# Patient Record
Sex: MALE | Race: WHITE
[De-identification: names, ages, dates, MRNs, and addresses within clinical notes are randomized per-mention and may not be internally consistent; named-entity substitution may affect disease eponyms.]

---

## 2017-07-08 ENCOUNTER — HOSPITAL ENCOUNTER (EMERGENCY)
Dept: HOSPITAL 47 - EC | Age: 52
Discharge: HOME | End: 2017-07-08
Payer: COMMERCIAL

## 2017-07-08 VITALS
SYSTOLIC BLOOD PRESSURE: 125 MMHG | DIASTOLIC BLOOD PRESSURE: 77 MMHG | TEMPERATURE: 98.2 F | RESPIRATION RATE: 16 BRPM | HEART RATE: 75 BPM

## 2017-07-08 DIAGNOSIS — Z79.82: ICD-10-CM

## 2017-07-08 DIAGNOSIS — Z79.899: ICD-10-CM

## 2017-07-08 DIAGNOSIS — I48.0: Primary | ICD-10-CM

## 2017-07-08 LAB
ALP SERPL-CCNC: 83 U/L (ref 38–126)
ALT SERPL-CCNC: 60 U/L (ref 21–72)
ANION GAP SERPL CALC-SCNC: 14 MMOL/L
APTT BLD: 25.5 SEC (ref 22–30)
AST SERPL-CCNC: 32 U/L (ref 17–59)
BASOPHILS # BLD AUTO: 0.1 K/UL (ref 0–0.2)
BASOPHILS NFR BLD AUTO: 1 %
BUN SERPL-SCNC: 13 MG/DL (ref 9–20)
CALCIUM SPEC-MCNC: 9.5 MG/DL (ref 8.4–10.2)
CH: 30.8
CHCM: 35.4
CHLORIDE SERPL-SCNC: 106 MMOL/L (ref 98–107)
CK SERPL-CCNC: 89 U/L (ref 55–170)
CO2 SERPL-SCNC: 21 MMOL/L (ref 22–30)
EOSINOPHIL # BLD AUTO: 0.1 K/UL (ref 0–0.7)
EOSINOPHIL NFR BLD AUTO: 2 %
ERYTHROCYTE [DISTWIDTH] IN BLOOD BY AUTOMATED COUNT: 5.16 M/UL (ref 4.3–5.9)
ERYTHROCYTE [DISTWIDTH] IN BLOOD: 13.3 % (ref 11.5–15.5)
GLUCOSE SERPL-MCNC: 169 MG/DL (ref 74–99)
HCT VFR BLD AUTO: 45.1 % (ref 39–53)
HDW: 2.67
HGB BLD-MCNC: 16 GM/DL (ref 13–17.5)
INR PPP: 1 (ref ?–1.1)
LUC NFR BLD AUTO: 2 %
LYMPHOCYTES # SPEC AUTO: 1.8 K/UL (ref 1–4.8)
LYMPHOCYTES NFR SPEC AUTO: 26 %
MAGNESIUM SPEC-SCNC: 2.2 MG/DL (ref 1.6–2.3)
MCH RBC QN AUTO: 31 PG (ref 25–35)
MCHC RBC AUTO-ENTMCNC: 35.4 G/DL (ref 31–37)
MCV RBC AUTO: 87.4 FL (ref 80–100)
MONOCYTES # BLD AUTO: 0.4 K/UL (ref 0–1)
MONOCYTES NFR BLD AUTO: 5 %
NEUTROPHILS # BLD AUTO: 4.5 K/UL (ref 1.3–7.7)
NEUTROPHILS NFR BLD AUTO: 64 %
NON-AFRICAN AMERICAN GFR(MDRD): >60
POTASSIUM SERPL-SCNC: 4.1 MMOL/L (ref 3.5–5.1)
PROT SERPL-MCNC: 6.7 G/DL (ref 6.3–8.2)
PT BLD: 10.2 SEC (ref 9–12)
SODIUM SERPL-SCNC: 141 MMOL/L (ref 137–145)
TROPONIN I SERPL-MCNC: <0.012 NG/ML (ref 0–0.03)
WBC # BLD AUTO: 0.15 10*3/UL
WBC # BLD AUTO: 7 K/UL (ref 3.8–10.6)
WBC (PEROX): 6.37

## 2017-07-08 PROCEDURE — 82550 ASSAY OF CK (CPK): CPT

## 2017-07-08 PROCEDURE — 96366 THER/PROPH/DIAG IV INF ADDON: CPT

## 2017-07-08 PROCEDURE — 93005 ELECTROCARDIOGRAM TRACING: CPT

## 2017-07-08 PROCEDURE — 85025 COMPLETE CBC W/AUTO DIFF WBC: CPT

## 2017-07-08 PROCEDURE — 71010: CPT

## 2017-07-08 PROCEDURE — 85730 THROMBOPLASTIN TIME PARTIAL: CPT

## 2017-07-08 PROCEDURE — 82553 CREATINE MB FRACTION: CPT

## 2017-07-08 PROCEDURE — 84443 ASSAY THYROID STIM HORMONE: CPT

## 2017-07-08 PROCEDURE — 36415 COLL VENOUS BLD VENIPUNCTURE: CPT

## 2017-07-08 PROCEDURE — 85379 FIBRIN DEGRADATION QUANT: CPT

## 2017-07-08 PROCEDURE — 84484 ASSAY OF TROPONIN QUANT: CPT

## 2017-07-08 PROCEDURE — 96365 THER/PROPH/DIAG IV INF INIT: CPT

## 2017-07-08 PROCEDURE — 83735 ASSAY OF MAGNESIUM: CPT

## 2017-07-08 PROCEDURE — 85610 PROTHROMBIN TIME: CPT

## 2017-07-08 PROCEDURE — 80053 COMPREHEN METABOLIC PANEL: CPT

## 2017-07-08 PROCEDURE — 99284 EMERGENCY DEPT VISIT MOD MDM: CPT

## 2017-07-08 NOTE — XR
EXAMINATION TYPE: XR chest 1V portable

 

DATE OF EXAM: 7/8/2017

 

Comparison: None

 

Clinical History: 51-year-old male dysrhythmia

 

Findings:

 The cardiomediastinal silhouette, aorta, and pulmonary vasculature are within normal limits. Strandy
 atelectasis in the lower lungs. Otherwise, lungs and pleural spaces are clear.   

 

 

Impression:

 No acute cardiopulmonary process.

## 2017-07-12 ENCOUNTER — HOSPITAL ENCOUNTER (OUTPATIENT)
Dept: HOSPITAL 47 - LABWHC1 | Age: 52
Discharge: HOME | End: 2017-07-12
Payer: COMMERCIAL

## 2017-07-12 DIAGNOSIS — I10: ICD-10-CM

## 2017-07-12 DIAGNOSIS — I48.0: Primary | ICD-10-CM

## 2017-07-12 PROCEDURE — 83704 LIPOPROTEIN BLD QUAN PART: CPT

## 2017-07-12 PROCEDURE — 36415 COLL VENOUS BLD VENIPUNCTURE: CPT

## 2017-07-15 LAB
LDLC SERPL ELPH-MCNC: 1655 NMOL/L (ref ?–1000)
Lab: 20.5 NM (ref 20.8–?)
Lab: 798 NMOL/L (ref ?–527)

## 2017-09-07 ENCOUNTER — HOSPITAL ENCOUNTER (OUTPATIENT)
Dept: HOSPITAL 47 - SLEEP | Age: 52
End: 2017-09-07
Attending: INTERNAL MEDICINE
Payer: COMMERCIAL

## 2017-09-07 DIAGNOSIS — Z79.82: ICD-10-CM

## 2017-09-07 DIAGNOSIS — R06.83: Primary | ICD-10-CM

## 2017-09-07 DIAGNOSIS — Z79.899: ICD-10-CM

## 2017-09-07 DIAGNOSIS — I48.0: ICD-10-CM

## 2017-09-07 DIAGNOSIS — E66.9: ICD-10-CM

## 2017-09-07 PROCEDURE — 99211 OFF/OP EST MAY X REQ PHY/QHP: CPT

## 2017-09-08 NOTE — CONS
CONSULTATION



DATE OF SERVICE:

09/07/2017



A 52-year-old gentleman who had been evaluated in the sleep center for possible

obstructive sleep apnea-hypopnea syndrome.



HISTORY OF PRESENT ILLNESS/SLEEP-WAKE EVALUATION:



SLEEP SCHEDULE:

Patient's usual sleep schedule on working days from around 9:30 p.m. until 4:30 or 4:45

a.m. On weekends from around 9:30 p.m. until 7 a.m.



FALLING ASLEEP:

No problems with falling asleep. He has TV set in bedroom.



DURING SLEEP:

He usually sleeps in the side position.  He snores and wakes up from sleep with

nocturia once.  No history of hypnagogic hallucinations, sleep paralysis or cataplexy.



DURING THE DAY/WAKE STATE:

The patient referred that he rest during the day, but no falling asleep and he is doing

this resting period 2 times.



[place] Sleepiness Scale is 5.



PAST MEDICAL HISTORY:

Positive for episodes of atrial fibrillation.



MEDICATIONS:

Aspirin, flecainide, vitamin D.



PAST SURGICAL HISTORY:

None.



SOCIAL HISTORY:

Negative for smoking. Alcohol consumption rarely.



REVIEW OF SYSTEMS:

Episodes of atrial fibrillation. No fevers. No double vision. No recent chest pain. No

shortness of breath. No abdominal pain. No bleeding episodes. No blood in urine.  No

seizure episodes.



FAMILY HISTORY:

Hypertension, stroke, snoring, insomnia.



PHYSICAL EXAMINATION:

During physical exam, a 52-year-old  gentleman without distress.

VITAL SIGNS:  /75, HR 80, RR 16, height 5 feet 8-1/2 inches, weight 230.8, BMI

34.4.  Neck 16 inches in circumference.  Temperature is 98.2. Oxygen saturation in room

air 97%.

HEENT:  PERRLA. EOMI. Oropharynx showed moderately low position of soft palate.

Restriction of nasal breathing bilaterally.

NECK: Supple, no JVD.  Thyroid is not palpable.

LUNGS: Clear to percussion and to auscultation.  Good air exchange.  No wheezing or

rhonchi.

HEART: S1, S2 regular.  No murmurs, gallops, or rubs.

ABDOMEN:  Obese.

EXTREMITIES:  1+ ankle edema.

CNS  Awake, alert, and oriented X3.  Cranial nerves 2 to 7 intact.  There is no

fasciculation or atrophy. noted.  No focal deficits observed.



IMPRESSION:

1. Snoring, restriction of nasal breathing, moderately low position of soft palate,

    possible obstructive sleep apnea-hypopnea syndrome.

2. Obesity, body mass index 34.4.

3. Paroxysmal nocturnal fibrillation.



PLAN:

1. Polysomnography for evaluation of patient's breathing during sleep.

2. CPAP/BiPAP titration if sleep study confirms obstructive sleep apnea-hypopnea

    syndrome.

3. Preferable position during sleep on the side.

4. No driving if patient feels any sleepiness.  Patient is aware of civil and criminal

    liability for unsafe driving.

5. I will see patient for follow-up visit to explain results of testing and following

    plan.



Thank you very much for referring this patient for consultation.



Sincerely,







Mamadou Johns MD, PhD, FAASM

Diplomat of [employer]

American Board of Internal Medicine

Medical Director of [facility]





MMHUEYL / IJPHILLIP: [id number] / Job#: [id number]

## 2017-10-11 ENCOUNTER — HOSPITAL ENCOUNTER (OUTPATIENT)
Dept: HOSPITAL 47 - SLEEP | Age: 52
Discharge: HOME | End: 2017-10-11
Attending: INTERNAL MEDICINE
Payer: COMMERCIAL

## 2017-10-11 DIAGNOSIS — E55.9: ICD-10-CM

## 2017-10-11 DIAGNOSIS — E66.9: ICD-10-CM

## 2017-10-11 DIAGNOSIS — R06.83: Primary | ICD-10-CM

## 2017-10-11 NOTE — PN
PROGRESS NOTE



This patient is a 52-year-old gentleman who has been followed in the sleep center to

discuss results of diagnostic sleep study.



We discussed results of sleep study. Sleep study did not show any significant

respiratory abnormalities. Total apnea-hypopnea index 0.9. Lowest oxygen level during

sleep 88.6%. Sleep efficiency was normal at 91%.  Sleep architecture was practically

normal except REM sleep decreased to 10.2%.



The patient has history of atrial fibrillation converted to normal sinus rhythm, and

his evaluation in the sleep center was related to that.



Loud snoring was documented during the sleep study.



MEDICATIONS:

1. Aspirin.

2. Flecainide.

3. Vitamin D.



PHYSICAL EXAMINATION:

Patient in no distress.

VITAL SIGNS: /83, HR 68, RR 16, temperature 97.1, weight 234 pounds.  [place]

Sleepiness Scale today is 3. Afebrile.

HEENT: PERRLA, EOMI. Evaluation of oropharynx showed tongue protrudes midline; low

position of soft palate.

NECK: Supple. No JVD. Thyroid is not palpable.

LUNGS: Clear to percussion and to auscultation. Good air exchange. No wheezing or

rhonchi.

HEART: S1, S2 irregularly irregular.

ABDOMEN: Soft, non-tender. Bowel sounds present.

EXTREMITIES: No clubbing or cyanosis.

CNS: Awake, alert and oriented x3. Cranial nerves 2 to 7 intact. There is no

fasciculation or atrophy noted. No focal deficits observed.



IMPRESSION:

1. No significant respiratory abnormalities have been documented during the sleep

    study.

2. Loud snoring has been documented.

3. Obesity; BMI around 35.

4. History of atrial fibrillation converted to normal sinus rhythm.

5. Low level of vitamin D.



PLAN:

1. Sleep hygiene with regular time in bed for at least 8 hours. Preferable position

    during the sleep on the side.

2. Losing weight.

3. No driving if feeling any sleepiness.

Thank you very much for allowing me to participate in management of your patient.



Sincerely,







Mamadou Johns MD, PhD, FAASM

Diplomat of American [employer] of [employer] Specialties

American Board of Internal Medicine

Medical Director of [facility]





MMODL / IJN: [id number] / Job#: [id number]

## 2020-08-13 ENCOUNTER — HOSPITAL ENCOUNTER (OUTPATIENT)
Dept: HOSPITAL 47 - RADUSWWP | Age: 55
Discharge: HOME | End: 2020-08-13
Attending: FAMILY MEDICINE
Payer: COMMERCIAL

## 2020-08-13 DIAGNOSIS — I83.892: Primary | ICD-10-CM

## 2020-08-13 PROCEDURE — 93970 EXTREMITY STUDY: CPT

## 2020-08-13 NOTE — US
EXAMINATION TYPE: US venous doppler duplex LE 

 

DATE OF EXAM: 8/13/2020 1:18 PM

 

COMPARISON: NONE

 

CLINICAL HISTORY: I83.892 Varicose veins of left lower extremity wit.

 

SIDE PERFORMED:

 

TECHNIQUE:  The lower extremity deep venous system is examined utilizing real time linear array sonog
alyssia with graded compression, doppler sonography and color-flow sonography.

 

VESSELS IMAGED:

External Iliac Vein (EIV)

Common Femoral Vein

Deep Femoral Vein

Greater Saphenous Vein *

Femoral Vein

Popliteal Vein

Small Saphenous Vein *

Proximal Calf Veins

(* superficial vessels)

 

 

 

Right Leg:  Negative for DVT

 

Left Leg:  Negative for DVT

 

Superficial thrombus seen in left thigh extending to calf. 

 

 

IMPRESSION:

1. No diagnostic evidence of DVT.

2. There is a SVT involving the left thigh extending to the calf.

## 2023-08-10 ENCOUNTER — HOSPITAL ENCOUNTER (EMERGENCY)
Dept: HOSPITAL 47 - EC | Age: 58
Discharge: HOME | End: 2023-08-10
Payer: COMMERCIAL

## 2023-08-10 VITALS
RESPIRATION RATE: 17 BRPM | HEART RATE: 55 BPM | TEMPERATURE: 97.9 F | SYSTOLIC BLOOD PRESSURE: 147 MMHG | DIASTOLIC BLOOD PRESSURE: 91 MMHG

## 2023-08-10 DIAGNOSIS — Z79.82: ICD-10-CM

## 2023-08-10 DIAGNOSIS — R42: Primary | ICD-10-CM

## 2023-08-10 DIAGNOSIS — I48.91: ICD-10-CM

## 2023-08-10 DIAGNOSIS — Z79.899: ICD-10-CM

## 2023-08-10 LAB
ALBUMIN SERPL-MCNC: 4.4 G/DL (ref 3.5–5)
ALP SERPL-CCNC: 96 U/L (ref 38–126)
ALT SERPL-CCNC: 36 U/L (ref 4–49)
ANION GAP SERPL CALC-SCNC: 7 MMOL/L
AST SERPL-CCNC: 49 U/L (ref 17–59)
BASOPHILS # BLD AUTO: 0 K/UL (ref 0–0.2)
BASOPHILS NFR BLD AUTO: 0 %
BUN SERPL-SCNC: 25 MG/DL (ref 9–20)
CALCIUM SPEC-MCNC: 8.7 MG/DL (ref 8.4–10.2)
CHLORIDE SERPL-SCNC: 106 MMOL/L (ref 98–107)
CO2 SERPL-SCNC: 24 MMOL/L (ref 22–30)
EOSINOPHIL # BLD AUTO: 0.1 K/UL (ref 0–0.7)
EOSINOPHIL NFR BLD AUTO: 2 %
ERYTHROCYTE [DISTWIDTH] IN BLOOD BY AUTOMATED COUNT: 5.35 M/UL (ref 4.3–5.9)
ERYTHROCYTE [DISTWIDTH] IN BLOOD: 12.8 % (ref 11.5–15.5)
GLUCOSE SERPL-MCNC: 129 MG/DL (ref 74–99)
HCT VFR BLD AUTO: 49.1 % (ref 39–53)
HGB BLD-MCNC: 16.7 GM/DL (ref 13–17.5)
INR PPP: 1 (ref ?–1.2)
LYMPHOCYTES # SPEC AUTO: 1 K/UL (ref 1–4.8)
LYMPHOCYTES NFR SPEC AUTO: 15 %
MCH RBC QN AUTO: 31.2 PG (ref 25–35)
MCHC RBC AUTO-ENTMCNC: 34 G/DL (ref 31–37)
MCV RBC AUTO: 91.8 FL (ref 80–100)
MONOCYTES # BLD AUTO: 0.4 K/UL (ref 0–1)
MONOCYTES NFR BLD AUTO: 6 %
NEUTROPHILS # BLD AUTO: 5.2 K/UL (ref 1.3–7.7)
NEUTROPHILS NFR BLD AUTO: 76 %
PLATELET # BLD AUTO: 213 K/UL (ref 150–450)
POTASSIUM SERPL-SCNC: 5.8 MMOL/L (ref 3.5–5.1)
PROT SERPL-MCNC: 7.9 G/DL (ref 6.3–8.2)
PT BLD: 10.2 SEC (ref 9–12)
SODIUM SERPL-SCNC: 137 MMOL/L (ref 137–145)
WBC # BLD AUTO: 6.8 K/UL (ref 3.8–10.6)

## 2023-08-10 PROCEDURE — 83605 ASSAY OF LACTIC ACID: CPT

## 2023-08-10 PROCEDURE — 93005 ELECTROCARDIOGRAM TRACING: CPT

## 2023-08-10 PROCEDURE — 36415 COLL VENOUS BLD VENIPUNCTURE: CPT

## 2023-08-10 PROCEDURE — 85025 COMPLETE CBC W/AUTO DIFF WBC: CPT

## 2023-08-10 PROCEDURE — 80053 COMPREHEN METABOLIC PANEL: CPT

## 2023-08-10 PROCEDURE — 96361 HYDRATE IV INFUSION ADD-ON: CPT

## 2023-08-10 PROCEDURE — 99284 EMERGENCY DEPT VISIT MOD MDM: CPT

## 2023-08-10 PROCEDURE — 96374 THER/PROPH/DIAG INJ IV PUSH: CPT

## 2023-08-10 PROCEDURE — 84484 ASSAY OF TROPONIN QUANT: CPT

## 2023-08-10 PROCEDURE — 85610 PROTHROMBIN TIME: CPT

## 2023-08-10 NOTE — ED
General Adult HPI





- General


Chief complaint: Dizziness


Stated complaint: Afib, Dizziness


Time Seen by Provider: 08/10/23 04:27


Source: patient, RN notes reviewed, old records reviewed


Mode of arrival: wheelchair


Limitations: no limitations





- History of Present Illness


Initial comments: 





49-year-old male history of atrial fibrillation presents for evaluation of 

dizziness, and nausea vomiting.  Patient's symptoms began prior to arrival.  He 

had been sleeping on the couch, he woke with these symptoms.  Can't the bed 

without complaint.  No chest pain, no abdominal pain.  Patient denies fever.  

Denies cough.  Denies focal numbness or weakness.  Denies headache.





- Related Data


                                Home Medications











 Medication  Instructions  Recorded  Confirmed


 


Aspirin EC [Ecotrin Low Dose] 81 mg PO DAILY 07/08/17 07/08/17


 


Cholecalciferol [Vitamin D3] 5,000 unit PO DAILY 07/08/17 07/08/17











                                    Allergies











Allergy/AdvReac Type Severity Reaction Status Date / Time


 


No Known Allergies Allergy   Verified 08/10/23 04:31














Review of Systems


ROS Statement: 


Those systems with pertinent positive or pertinent negative responses have been 

documented in the HPI.





ROS Other: All systems not noted in ROS Statement are negative.





Past Medical History


Past Medical History: Atrial Fibrillation


Additional Past Medical History / Comment(s): Superficial Venous Thrombosis 

(years ago)


History of Any Multi-Drug Resistant Organisms: None Reported


Past Surgical History: No Surgical Hx Reported


Past Psychological History: No Psychological Hx Reported


Smoking Status: Never smoker


Past Alcohol Use History: None Reported


Past Drug Use History: None Reported





General Exam


Limitations: no limitations


General appearance: alert, in no apparent distress


Head exam: Present: atraumatic, normocephalic


Eye exam: Present: normal appearance, PERRL.  Absent: nystagmus


ENT exam: Present: normal exam, mucous membranes moist


Neck exam: Present: normal inspection.  Absent: tenderness, meningismus


Respiratory exam: Present: normal lung sounds bilaterally.  Absent: respiratory 

distress, wheezes


Cardiovascular Exam: Present: regular rate, normal rhythm


GI/Abdominal exam: Present: soft.  Absent: distended, tenderness, guarding


Extremities exam: Present: normal inspection, normal capillary refill


Neurological exam: Present: alert, oriented X3, CN II-XII intact, other (Normal 

finger to nose bilaterally, no ataxia).  Absent: motor sensory deficit


Psychiatric exam: Present: normal affect, normal mood


Skin exam: Present: warm, dry, intact, normal color.  Absent: cyanosis, erythema





Course


                                   Vital Signs











  08/10/23





  04:31


 


Temperature 98.4 F


 


Pulse Rate 57 L


 


Respiratory 16





Rate 


 


Blood Pressure 152/92


 


O2 Sat by Pulse 100





Oximetry 














- Reevaluation(s)


Reevaluation #1: 





08/10/23 06:56


PT reevaluated, resting comfortably.  Symptoms significantly improved.  No 

further vomiting.  No ataxia, no focal numbness or weakness.  No chest pain or 

abdominal pain.





Medical Decision Making





- Medical Decision Making





Was pt. sent in by a medical professional or institution (, PA, NP, urgent 

care, hospital, or nursing home...) When possible be specific


@  -No


Did you speak to anyone other than the patient for history (EMS, parent, family,

police, friend...)? What history was obtained from this source 


@  -No


Did you review nursing and triage notes (agree or disagree)?  Why? 


@  -I reviewed and agree with nursing and triage notes


Were old charts reviewed (outside hosp., previous admission, EMS record, old 

EKG, old radiological studies, urgent care reports/EKG's, nursing home records)?

Report findings 


@  -No old charts were reviewed


Differential Diagnosis (chest pain, altered mental status, abdominal pain women,

abdominal pain men, vaginal bleeding, weakness, fever, dyspnea, syncope, 

headache, dizziness, GI bleed, back pain, seizure, CVA, palpatations, mental 

health, musculoskeletal)? 


@  -CVA, vertigo, arrhythmia


EKG interpreted by me (3pts min.).


@  -EKG: Sinus bradycardia with first-degree AV block, rate of 58, VT interval 

264, QRS duration 98, , no ST segment elevation.


X-rays interpreted by me (1pt min.).


@  -None done


CT interpreted by me (1pt min.).


@  -None done


U/S interpreted by me (1pt. min.).


@  -None done


What testing was considered but not performed or refused? (CT, X-rays, U/S, 

labs)? Why?


@  -None


What meds were considered but not given or refused? Why?


@  -None


Did you discuss the management of the patient with other professionals 

(professionals i.e. , PA, NP, lab, RT, psych nurse, , , 

teacher, , )? Give summary


@  -No


Was smoking cessation discussed for >3mins.?


@  -No


Was critical care preformed (if so, how long)?


@  -No


Were there social determinants of health that impacted care today? How? 

(Homelessness, low income, unemployed, alcoholism, drug addiction, 

transportation, low edu. Level, literacy, decrease access to med. care, detention, 

rehab)?


@  -No


Was there de-escalation of care discussed even if they declined (Discuss DNR or 

withdrawal of care, Hospice)? DNR status


@  -No


What co-morbidities impacted this encounter? (DM, HTN, Smoking, COPD, CAD, Can

cer, CVA, ARF, Chemo, Hep., AIDS, mental health diagnosis, sleep apnea, morbid 

obesity)?


@Atrial fibrillation


Was patient admitted / discharged? Hospital course, mention meds given and 

route, prescriptions, significant lab abnormalities, going to OR and other per

tinent info.


@57-year-old male presenting with an episode of dizziness, room spinning 

sensation and vomiting here patient is in sinus rhythm.  His stable vitals.  He 

has no headache, no chest pain, no abdominal pain.  He has no focal numbness or 

weakness, no ataxia or nystagmus on exam.  Symptoms are treated with IV fluid, 

Zofran and meclizine.  He does feel significantly better while in the emergency 

department.





PT has normal CBC, CMP showing a mildly elevated potassium but this is 

hemolyzed.  Normal blood sugar.  Negative troponin.





Patient given strict return parameters including any stroke symptoms, chest 

pain.


Undiagnosed new problem with uncertain prognosis?


@  -No


Drug Therapy requiring intensive monitoring for toxicity (Heparin, Nitro, 

Insulin, Cardizem)?


@  -No


Were any procedures done?


@  -No


Diagnosis/symptom?


@  -Vertigo


Acute, or Chronic, or Acute on Chronic?


@  -Acute


Uncomplicated (without systemic symptoms) or Complicated (systemic symptoms)?


@  -default


Side effects of treatment?


@  -No


Exacerbation, Progression, or Severe Exacerbation?


@  -No


Poses a threat to life or bodily function? How? (Chest pain, USA, MI, pneumonia,

PE, COPD, DKA, ARF, appy, cholecystitis, CVA, Diverticulitis, Homicidal, 

Suicidal, threat to staff... and all critical care pts)


@  -[Low risk at this time





- Lab Data


Result diagrams: 


                                 08/10/23 05:31





                                 08/10/23 05:31


                                   Lab Results











  08/10/23 08/10/23 08/10/23 Range/Units





  05:31 05:31 05:31 


 


WBC  6.8    (3.8-10.6)  k/uL


 


RBC  5.35    (4.30-5.90)  m/uL


 


Hgb  16.7    (13.0-17.5)  gm/dL


 


Hct  49.1    (39.0-53.0)  %


 


MCV  91.8    (80.0-100.0)  fL


 


MCH  31.2    (25.0-35.0)  pg


 


MCHC  34.0    (31.0-37.0)  g/dL


 


RDW  12.8    (11.5-15.5)  %


 


Plt Count  213    (150-450)  k/uL


 


MPV  7.0    


 


Neutrophils %  76    %


 


Lymphocytes %  15    %


 


Monocytes %  6    %


 


Eosinophils %  2    %


 


Basophils %  0    %


 


Neutrophils #  5.2    (1.3-7.7)  k/uL


 


Lymphocytes #  1.0    (1.0-4.8)  k/uL


 


Monocytes #  0.4    (0-1.0)  k/uL


 


Eosinophils #  0.1    (0-0.7)  k/uL


 


Basophils #  0.0    (0-0.2)  k/uL


 


PT   10.2   (9.0-12.0)  sec


 


INR   1.0   (<1.2)  


 


Sodium    137  (137-145)  mmol/L


 


Potassium    5.8 H  (3.5-5.1)  mmol/L


 


Chloride    106  ()  mmol/L


 


Carbon Dioxide    24  (22-30)  mmol/L


 


Anion Gap    7  mmol/L


 


BUN    25 H  (9-20)  mg/dL


 


Creatinine    0.95  (0.66-1.25)  mg/dL


 


Est GFR (CKD-EPI)AfAm    >90  (>60 ml/min/1.73 sqM)  


 


Est GFR (CKD-EPI)NonAf    88  (>60 ml/min/1.73 sqM)  


 


Glucose    129 H  (74-99)  mg/dL


 


Plasma Lactic Acid Robert     (0.7-2.0)  mmol/L


 


Calcium    8.7  (8.4-10.2)  mg/dL


 


Total Bilirubin    1.5 H  (0.2-1.3)  mg/dL


 


AST    49  (17-59)  U/L


 


ALT    36  (4-49)  U/L


 


Alkaline Phosphatase    96  ()  U/L


 


Troponin I     (0.000-0.034)  ng/mL


 


Total Protein    7.9  (6.3-8.2)  g/dL


 


Albumin    4.4  (3.5-5.0)  g/dL














  08/10/23 08/10/23 Range/Units





  05:31 05:31 


 


WBC    (3.8-10.6)  k/uL


 


RBC    (4.30-5.90)  m/uL


 


Hgb    (13.0-17.5)  gm/dL


 


Hct    (39.0-53.0)  %


 


MCV    (80.0-100.0)  fL


 


MCH    (25.0-35.0)  pg


 


MCHC    (31.0-37.0)  g/dL


 


RDW    (11.5-15.5)  %


 


Plt Count    (150-450)  k/uL


 


MPV    


 


Neutrophils %    %


 


Lymphocytes %    %


 


Monocytes %    %


 


Eosinophils %    %


 


Basophils %    %


 


Neutrophils #    (1.3-7.7)  k/uL


 


Lymphocytes #    (1.0-4.8)  k/uL


 


Monocytes #    (0-1.0)  k/uL


 


Eosinophils #    (0-0.7)  k/uL


 


Basophils #    (0-0.2)  k/uL


 


PT    (9.0-12.0)  sec


 


INR    (<1.2)  


 


Sodium    (137-145)  mmol/L


 


Potassium    (3.5-5.1)  mmol/L


 


Chloride    ()  mmol/L


 


Carbon Dioxide    (22-30)  mmol/L


 


Anion Gap    mmol/L


 


BUN    (9-20)  mg/dL


 


Creatinine    (0.66-1.25)  mg/dL


 


Est GFR (CKD-EPI)AfAm    (>60 ml/min/1.73 sqM)  


 


Est GFR (CKD-EPI)NonAf    (>60 ml/min/1.73 sqM)  


 


Glucose    (74-99)  mg/dL


 


Plasma Lactic Acid Robert  1.9   (0.7-2.0)  mmol/L


 


Calcium    (8.4-10.2)  mg/dL


 


Total Bilirubin    (0.2-1.3)  mg/dL


 


AST    (17-59)  U/L


 


ALT    (4-49)  U/L


 


Alkaline Phosphatase    ()  U/L


 


Troponin I   0.013  (0.000-0.034)  ng/mL


 


Total Protein    (6.3-8.2)  g/dL


 


Albumin    (3.5-5.0)  g/dL














Disposition


Clinical Impression: 


 Vertigo





Disposition: HOME SELF-CARE


Condition: Fair


Instructions (If sedation given, give patient instructions):  Dizziness (ED)


Additional Instructions: 


Please take over-the-counter meclizine if symptoms persist.  If any new symptoms

 develop such as headache, chest pain, limb numbness or weakness   Please return

 to the emergency department.


Is patient prescribed a controlled substance at d/c from ED?: No


Referrals: 


None,Stated [Primary Care Provider] - 1-2 days


Time of Disposition: 06:59

## 2024-10-17 NOTE — ED
Continue same medications and treatments.   Patient educated on proper medication use.   Patient educated on risk factor modification.   Please bring any lab results from other providers / physicians to your next appointment.     Please bring all medicines, vitamins, and herbal supplements with you when you come to the office.     Prescriptions will not be filled unless you are compliant with your follow up appointments or have a follow up appointment scheduled as per instruction of your physician. Refills should be requested at the time of your visit.    MAKE SURE YOU STOP ASPIRIN AND ATENOLOL  START ELIQUIS 5 MG TWICE DAILY    WILL TRY TO MOVE UP DR ENAMORADO OFFICE VISIT TO DUE 6 SECOND PAUSE    FOLLOW UP 3 MONTHS AFTER DR ENAMORADO APPOINTMENT    I, Soraya Lazcano LPN, am scribing for and in the presence of Dr. Juwan Naqvi, DO, FACC       Dizziness HPI





- General


Chief Complaint: Dizziness


Stated Complaint: Dizzy/Heart Racing


Time Seen by Provider: 07/08/17 19:17


Source: patient, RN notes reviewed


Mode of arrival: ambulatory


Limitations: no limitations





- History of Present Illness


Initial Comments: 





This is a 51-year-old male with a benign past medical history states he had 

episodes hours ago of some dizziness when he went so some palpitations.  No 

chest pain no shortness of breath no fevers chills or sweats.  He did have some 

tingling to the back of his neck which has resolved.  His only medical history 

is that of having a left calf superficial blood clot 10 years ago.  There is a 

family history of DVT and thyroid disease.  Patient denies smoking cigarettes 

denies any alcohol consumption he drinks one cup of coffee daily.  No street 

drugs.  He does state he may have had 3 similar episodes in the past


MD Complaint: dizziness, lightheadedness





- Related Data


 Home Medications











 Medication  Instructions  Recorded  Confirmed


 


Aspirin EC [Ecotrin Low Dose] 81 mg PO DAILY 07/08/17 07/08/17


 


Cholecalciferol [Vitamin D3] 5,000 unit PO DAILY 07/08/17 07/08/17











 Allergies











Allergy/AdvReac Type Severity Reaction Status Date / Time


 


No Known Allergies Allergy   Verified 07/08/17 19:54














Review of Systems


ROS Statement: 


Those systems with pertinent positive or pertinent negative responses have been 

documented in the HPI.





ROS Other: All systems not noted in ROS Statement are negative.





Past Medical History


Past Medical History: No Reported History


Additional Past Medical History / Comment(s): Superficial Venous Thrombosis (

years ago)


History of Any Multi-Drug Resistant Organisms: None Reported


Past Surgical History: No Surgical Hx Reported


Past Psychological History: No Psychological Hx Reported


Smoking Status: Never smoker


Past Alcohol Use History: None Reported


Past Drug Use History: None Reported





General Exam





- General Exam Comments


Initial Comments: 





This is a well-developed well-nourished awake alert oriented times 3 male


Limitations: no limitations


General appearance: alert, in no apparent distress


Head exam: Present: atraumatic, normocephalic, normal inspection


Eye exam: Present: normal appearance, PERRL, EOMI.  Absent: scleral icterus, 

conjunctival injection, periorbital swelling


ENT exam: Present: normal exam, mucous membranes moist


Neck exam: Present: normal inspection.  Absent: tenderness, meningismus, 

lymphadenopathy


Respiratory exam: Present: normal lung sounds bilaterally.  Absent: respiratory 

distress, wheezes, rales, rhonchi, stridor


Cardiovascular Exam: Present: tachycardia, irregular rhythm.  Absent: systolic 

murmur, diastolic murmur, rubs, gallop, clicks


GI/Abdominal exam: Present: soft, normal bowel sounds.  Absent: distended, 

tenderness, guarding, rebound, rigid


Extremities exam: Present: normal inspection, full ROM, normal capillary 

refill.  Absent: tenderness, pedal edema, joint swelling, calf tenderness


Back exam: Present: normal inspection


Neurological exam: Present: alert, oriented X3, CN II-XII intact


Psychiatric exam: Present: normal affect, normal mood


Skin exam: Present: warm, dry, intact, normal color.  Absent: rash





Course


 Vital Signs











  07/08/17 07/08/17 07/08/17





  19:07 19:28 20:07


 


Temperature 98.7 F  


 


Pulse Rate 85  139 H


 


Pulse Rate [  130 H 





Cardiac Monitor   





]   


 


Respiratory 18  18





Rate   


 


Blood Pressure 106/67  111/75


 


O2 Sat by Pulse 99  98





Oximetry   














  07/08/17 07/08/17





  20:34 21:09


 


Temperature  


 


Pulse Rate 112 H 88


 


Pulse Rate [  





Cardiac Monitor  





]  


 


Respiratory 18 18





Rate  


 


Blood Pressure 104/75 129/80


 


O2 Sat by Pulse 99 97





Oximetry  














- Reevaluation(s)


Reevaluation #1: 





07/08/17 21:23


Reevaluation patient reveals patient felt much improved he was noted converted 

back to normal sinus rhythm.  EKG was done showed a sinus rhythm with first-

degree AV block rate was 86 KS interval 218 QRS 90 QT since QTC of 350/428.





EKG Findings





- EKG Results:


EKG: interpreted by BRANDON (Atrial fibrillation with a rate of 1:30 QRS 86 QT 

since QTC of 314/462 no acute ST-T wave changes)





Medical Decision Making





- Medical Decision Making





The patient did convert to a normal sinus rhythm he was asymptomatic felt much 

improved.  We a long discussion regarding the findings patient will be 

discharged he will be discharged to follow-up with his doctor did recommend 

cardiology consultation in the outpatient setting.  Patient will follow-up in 

consistency his family doctor we did discuss that if symptoms should recur he 

is to come back for reevaluation.





- Lab Data


Result diagrams: 


 07/08/17 19:20





 07/08/17 19:20


 Lab Results











  07/08/17 07/08/17 07/08/17 Range/Units





  19:20 19:20 19:20 


 


WBC   7.0   (3.8-10.6)  k/uL


 


RBC   5.16   (4.30-5.90)  m/uL


 


Hgb   16.0   (13.0-17.5)  gm/dL


 


Hct   45.1   (39.0-53.0)  %


 


MCV   87.4   (80.0-100.0)  fL


 


MCH   31.0   (25.0-35.0)  pg


 


MCHC   35.4   (31.0-37.0)  g/dL


 


RDW   13.3   (11.5-15.5)  %


 


Plt Count   218   (150-450)  k/uL


 


Neutrophils %   64   %


 


Lymphocytes %   26   %


 


Monocytes %   5   %


 


Eosinophils %   2   %


 


Basophils %   1   %


 


Neutrophils #   4.5   (1.3-7.7)  k/uL


 


Lymphocytes #   1.8   (1.0-4.8)  k/uL


 


Monocytes #   0.4   (0-1.0)  k/uL


 


Eosinophils #   0.1   (0-0.7)  k/uL


 


Basophils #   0.1   (0-0.2)  k/uL


 


PT     (9.0-12.0)  sec


 


INR     (<1.1)  


 


APTT     (22.0-30.0)  sec


 


D-Dimer     (<0.60)  mg/L FEU


 


Sodium    141  (137-145)  mmol/L


 


Potassium    4.1  (3.5-5.1)  mmol/L


 


Chloride    106  ()  mmol/L


 


Carbon Dioxide    21 L  (22-30)  mmol/L


 


Anion Gap    14  mmol/L


 


BUN    13  (9-20)  mg/dL


 


Creatinine    1.10  (0.66-1.25)  mg/dL


 


Est GFR (MDRD) Af Amer    >60  (>60 ml/min/1.73 sqM)  


 


Est GFR (MDRD) Non-Af    >60  (>60 ml/min/1.73 sqM)  


 


Glucose    169 H  (74-99)  mg/dL


 


Calcium    9.5  (8.4-10.2)  mg/dL


 


Magnesium    2.2  (1.6-2.3)  mg/dL


 


Total Bilirubin    0.8  (0.2-1.3)  mg/dL


 


AST    32  (17-59)  U/L


 


ALT    60  (21-72)  U/L


 


Alkaline Phosphatase    83  ()  U/L


 


Total Creatine Kinase  89    ()  U/L


 


CK-MB (CK-2)  0.5    (0.0-2.4)  ng/mL


 


CK-MB (CK-2) Rel Index  0.6    


 


Troponin I  <0.012    (0.000-0.034)  ng/mL


 


Total Protein    6.7  (6.3-8.2)  g/dL


 


Albumin    4.1  (3.5-5.0)  g/dL


 


TSH    1.740  (0.465-4.680)  mIU/L














  07/08/17 Range/Units





  19:20 


 


WBC   (3.8-10.6)  k/uL


 


RBC   (4.30-5.90)  m/uL


 


Hgb   (13.0-17.5)  gm/dL


 


Hct   (39.0-53.0)  %


 


MCV   (80.0-100.0)  fL


 


MCH   (25.0-35.0)  pg


 


MCHC   (31.0-37.0)  g/dL


 


RDW   (11.5-15.5)  %


 


Plt Count   (150-450)  k/uL


 


Neutrophils %   %


 


Lymphocytes %   %


 


Monocytes %   %


 


Eosinophils %   %


 


Basophils %   %


 


Neutrophils #   (1.3-7.7)  k/uL


 


Lymphocytes #   (1.0-4.8)  k/uL


 


Monocytes #   (0-1.0)  k/uL


 


Eosinophils #   (0-0.7)  k/uL


 


Basophils #   (0-0.2)  k/uL


 


PT  10.2  (9.0-12.0)  sec


 


INR  1.0  (<1.1)  


 


APTT  25.5  (22.0-30.0)  sec


 


D-Dimer  0.45  (<0.60)  mg/L FEU


 


Sodium   (137-145)  mmol/L


 


Potassium   (3.5-5.1)  mmol/L


 


Chloride   ()  mmol/L


 


Carbon Dioxide   (22-30)  mmol/L


 


Anion Gap   mmol/L


 


BUN   (9-20)  mg/dL


 


Creatinine   (0.66-1.25)  mg/dL


 


Est GFR (MDRD) Af Amer   (>60 ml/min/1.73 sqM)  


 


Est GFR (MDRD) Non-Af   (>60 ml/min/1.73 sqM)  


 


Glucose   (74-99)  mg/dL


 


Calcium   (8.4-10.2)  mg/dL


 


Magnesium   (1.6-2.3)  mg/dL


 


Total Bilirubin   (0.2-1.3)  mg/dL


 


AST   (17-59)  U/L


 


ALT   (21-72)  U/L


 


Alkaline Phosphatase   ()  U/L


 


Total Creatine Kinase   ()  U/L


 


CK-MB (CK-2)   (0.0-2.4)  ng/mL


 


CK-MB (CK-2) Rel Index   


 


Troponin I   (0.000-0.034)  ng/mL


 


Total Protein   (6.3-8.2)  g/dL


 


Albumin   (3.5-5.0)  g/dL


 


TSH   (0.465-4.680)  mIU/L














- Radiology Data


Radiology results: report reviewed (I did review the imaging and reports no 

acute findings.), image reviewed





Disposition


Clinical Impression: 


 Paroxysmal atrial fibrillation with rapid ventricular response





Disposition: HOME SELF-CARE


Condition: Good


Instructions:  Atrial Fibrillation (ED)


Referrals: 


Arnav Nagy MD [Primary Care Provider] - 1-2 days


Yovany Trimble MD [STAFF PHYSICIAN] - 1-2 days

## 2025-06-09 ENCOUNTER — HOSPITAL ENCOUNTER (OUTPATIENT)
Dept: HOSPITAL 47 - RADUSWWP | Age: 60
Discharge: HOME | End: 2025-06-09
Attending: FAMILY MEDICINE
Payer: COMMERCIAL

## 2025-06-09 DIAGNOSIS — R60.0: ICD-10-CM

## 2025-06-09 DIAGNOSIS — I82.812: Primary | ICD-10-CM
